# Patient Record
(demographics unavailable — no encounter records)

---

## 2024-10-31 NOTE — ASSESSMENT
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 new splenic foci (subcm) on PET post treatment in Jan 2019.  Abd US done Jan 2019 - no abnormal splenic lesion noted. CT c/a/p done 6/2019 - no e/o disease progression. Relapsed lymphoma noted on imaging in Feb 2020 s/p splenectomy. Received salvage R-ICE, C1D1 7/9/2020, s/p cycle 3, refused ASCT. Post treatment PET done Nov 2020 with no PET avid lymph nodes.  post-splenectomy Vaccinations done  - s/p pneumovax by pcp May 2020, to be repeated in 5 years.  - Meningococcal serotype ACWY vaccine given 6/9/2020, booster given 8/2020, to be repeated in 5 years.  - Bexsero vaccine given  - H. Influenza type B vaccination given  CT scans done on 12/20/22 -stable. Repeated CT done 11/2023 and 10/2024 stable.  CBC stable - reviewed with patient RTC 1 year

## 2024-10-31 NOTE — PHYSICAL EXAM
[Fully active, able to carry on all pre-disease performance without restriction] : Status 0 - Fully active, able to carry on all pre-disease performance without restriction [Normal] : affect appropriate [Ulcers] : no ulcers [Mucositis] : no mucositis [Thrush] : no thrush [Vesicles] : no vesicles [de-identified] : well healed surgical scars, umbilical hernia -nontender [de-identified] : + varicose veins

## 2024-10-31 NOTE — HISTORY OF PRESENT ILLNESS
[Disease:__________________________] : Disease: [unfilled] [de-identified] : 59 yo male with recent hx of malaise and fatigue, developed shinges of RUE in Dec 2017. Pt was seen in [facility] ED Jan 2018, at that time had mild hypercalcemia Ca 11, moderate anemia Hb 10, HIV serologies were negative. Pt was advised of dx of shingles and sent for outpt f/u. Pt had protracted course of viral syndrome lasted approx 3 months, now resolving. Pt was seen by Dr Juarez on May 24, 2018, to establish routine pcp care and noted to have hypercalcemia Ca 13, with anemia Hb 11, and elevated vit D 1,25 OH levels - concerning for malignancy. Pt was referred for hematology work-up. Pt still c/o fatigue, appetite is good. He has noticed 10-15lb wt loss over the past 6 months but he reports he has been regaining wt with eating. He denies any fevers or chills, no other hx of recent infections, no sick contacts." [de-identified] : IV [de-identified] : BMbx: involved by T cell/histiocyte-rich large B cell lymphoma LN biopsy done by robotic laproscopic technique with Dr Downing on 7/25/18.  He received zometa infusion to manage HyperCa.  He received C1 CHOEP on 8/10/2018. He completed 6 cycles of CHOEP.  Post treatment PET/CT 1/14/19: 1. Mild retroperitoneal and mesenteric lymphadenopathy with minimal FDG activity (Deauville 2), similar, or decreased in size and metabolism as compared to prior study dated 10/1/2018. 2. Hepatosplenomegaly, not significantly changed. New foci of hypermetabolism within the spleen, are concerning for recurrent disease (Deauville 4).  3. Resolution of diffuse bone marrow hypermetabolism with discrete, difficult to delineate hypermetabolic foci in right iliac bone in anterior aspect of left third rib (Deauville 4).  PET/CT 2/13/20: 1.  New and increased FDG avid splenic lesions and new hepatic lesions, generally unchanged since CT June 19, 2019 suspicious for lymphoma relapse (Deauville 5). 2.  New FDG avid thoracic and retroperitoneal/iliac lymphadenopathy (Deauville 5). 3.  FDG avid marrow lesions suspicious for lymphomatous involvement. Resolution of FDG avid foci right anterior iliac bone and left third rib. 4.  Increased left hip periprosthetic FDG avidity, with unchanged large lucent areas in the underlying left acetabulum and surrounding the prosthetic left hip suspicious for loosening. Suggest further evaluation with MRI and clinical correlation.  BMbx 2/26/20: Small foci of small lymphocytes; no morphologic evidence of T-cell histiocyte rich B-cell lymphoma His splenectomy is delayed due to COVID-19 restrictions to emergent surgeries only at this time. He had a delay in vaccinations due to meds not available at pcp office. He is s/p splenectomy on 5/21/20 -path consistent w/ T-cell/histiocyte-rich large B cell lymphoma.  He completed cycle 3 of RICE. He had a PET done post cycle 2 which showed a good response, significant reduction in tumor burden. He was also seen by BMT group and refused ASCT as recommended. This was discussed with him on the last visit. He today is refusing ASCT. He voiced concern for prolonged hospital stay. He has gained weight since starting salvage chemo. He also has significant fatigue but denies fever or chills.  He also reports improved energy but notes his hips hurt more and more. He is overdue for replacement of ortho prosthesis in hips especially Lt hip. He is requesting a referral to ortho. He has no new focal complaints otherwise.  CT C/A/P 4/3/21: A 0.2 cm right upper lobe nodule and a 0.8 x 0.6 cm left internal mammary lymph node are unchanged since 11/5/2020. Multiple subcentimeter hypodense liver lesions, overall decreased in size and number since 5/19/2020. Status post splenectomy. A 1.5 cm splenule has slightly increased in size, measuring 0.8 cm on 5/19/2020 and 1.3 cm on 8/1/2020. A complex 7.1 x 4.4 cm collection in the post splenectomy bed adjacent to the pancreatic tail is not significantly changed since 11/5/2020. Retroperitoneal and mesenteric lymphadenopathy, not significantly changed since 11/5/2020. Extensive lucencies in the left acetabulum and proximal left femur, not significantly changed since 11/5/2020, which may be secondary to particle disease/loosening. Further evaluation is again recommended as indicated.  Pt reports feeling well. Back to exercising. Pt went back to work at [employer]. He received his COVID vaccine.   CT C/A/P 10/12/21: 1.  Stable innumerable hepatic micronodules and previously seen small pulmonary nodules 2.  Stable left internal mammary, retroperitoneal and small bowel mesenteric lymphadenopathy 3.  Mild decrease in size of the distal pancreatic mass and the left upper quadrant subdiaphragmatic nodules  Pt was seen today, he feels well over all, continue exercising and working. He has been taking Valasta since Splenotomy.  Denied any new acute complaints.    CT C/A/P 4/21/22: Stable examination compared with prior study 10/12/2021.  He had umbilical hernia repair 1/13/23. He saw vascular for b/l large symptomatic varicose veins -plan to undergo stab phlebectomy  12/11/23;  Patient is seen today for a f/u apt. He feels well overall.   s/p left LE stab phlebectomy on 6/7/23; right LE stab phlebectomy on 10/4/23.  CT C/A/P done 11/10/23 showed stable, subcentimeter pelvic and retroperitoneal nodes;  Had intermittent right arm and leg numbness. Denied any weakness or pain. He will f/u w/ a chiropractor, he stated acupuncture helps with the numbness.    CT C/A/P 10/7/24: Overall, no significant change since the prior exam. Less prominent postoperative changes in the pancreatectomy bed.